# Patient Record
Sex: MALE | Race: WHITE
[De-identification: names, ages, dates, MRNs, and addresses within clinical notes are randomized per-mention and may not be internally consistent; named-entity substitution may affect disease eponyms.]

---
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
## 2022-10-06 NOTE — UC
Throat Pain/Nasal Ethan HPI





- HPI Summary


HPI Summary: 


Per RN triage:


"Onset this morning headache, nausea, sore throat, muscle pain, weakness."


-sx started this AM


-no rash. no fevers


+ST. no cough. 


+swollen glands


+ significant msucle aches. chills. 


+ nausea. no v/d. no abd pain


-GF Tamar is being seen as well. he reports that she has  ST too. 


-took LETTY ~ 2 pm (2 hrs before vitals)





- History of Current Complaint


Chief Complaint: UCGeneralIllness


Stated Complaint: ST,FEVER,CHILLS


Time Seen by Provider: 10/17/19 16:18


Pain Intensity: 5





- Allergies/Home Medications


Home Medications: 


 Home Medications





Pain Med 2 tab PO SEE INSTRUCTIONS PRN 10/17/19 [History]











PMH/Surg Hx/FS Hx/Imm Hx


Previously Healthy: Yes





- Surgical History


Surgical History: None





- Family History


Known Family History: Positive: Diabetes





- Social History


Alcohol Use: None


Substance Use Type: Marijuana


Smoking Status (MU): Smoker, Current Status Unknown


Type: Smokeless Tobacco





- Immunization History


Vaccination Up to Date: Yes





Review of Systems


All Other Systems Reviewed And Are Negative: Yes


Constitutional: Positive: Fever, Chills, Fatigue


Skin: Negative: Negative, Rash


Eyes: Positive: Negative


ENT: Positive: Sore Throat.  Negative: Ear Ache, Sinus Congestion, Sinus Pain/

Tenderness


Respiratory: Positive: Negative.  Negative: Shortness Of Breath, Cough


Cardiovascular: Positive: Negative.  Negative: Palpitations, Chest Pain


Gastrointestinal: Positive: Nausea.  Negative: Abdominal Pain, Vomiting, 

Diarrhea


Genitourinary: Positive: Negative


Motor: Positive: Negative


Neurovascular: Positive: Negative


Musculoskeletal: Positive: Negative


Neurological: Positive: Negative


Psychological: Positive: Negative


Is Patient Immunocompromised?: No





Physical Exam


Triage Information Reviewed: Yes


Appearance: Ill-Appearing - mild. wearing hooded sweatshirt in room bc he is 

cold


Vital Signs: 


 Initial Vital Signs











Temp  100.6 F   10/17/19 15:44


 


Pulse  101   10/17/19 15:44


 


Resp  24   10/17/19 15:44


 


BP  101/52   10/17/19 15:44


 


Pulse Ox  97   10/17/19 15:44











Vital Signs Reviewed: Yes


Eye Exam: Normal


ENT: Positive: Pharyngeal erythema, TMs normal, Uvula midline.  Negative: 

Tonsillar swelling, Tonsillar exudate, Sinus tenderness


Neck exam: Normal


Neck: Positive: Supple, Nontender, No Lymphadenopathy


Respiratory Exam: Normal


Respiratory: Positive: Lungs clear, Normal breath sounds, No respiratory 

distress, No accessory muscle use.  Negative: Crackles, Rhonchi, Stridor, 

Wheezing


Cardiovascular Exam: Normal


Cardiovascular: Positive: RRR, No Murmur, Pulses Normal


Abdominal Exam: Normal


Abdomen Description: Positive: Nontender, Soft


Bowel Sounds: Positive: Present


Musculoskeletal Exam: Normal


Neurological Exam: Normal


Psychological Exam: Normal


Skin Exam: Normal


Skin: Negative: Rashes





Throat Pain/Nasal Course/Dx





- Course


Course Of Treatment: 





+ rapid strep


-neg flu





-pt reports that he does not have a PCN allergy and that he is sure of it. His 

dad has an allergy to it he reports. has never had a problem w/ PCN. in past. 





- Differential Dx/Diagnosis


Differential Diagnosis/HQI/PQRI: Pharyngitis, Tonsillitis, URI


Provider Diagnosis: 


 Strep pharyngitis








Discharge ED





- Sign-Out/Discharge


Documenting (check all that apply): Patient Departure


All imaging exams completed and their final reports reviewed: No Studies





- Discharge Plan


Condition: Stable


Disposition: HOME


Prescriptions: 


Amoxicillin PO (*) [Amoxicillin 875 MG (*)] 875 mg PO BID #20 tab


Patient Education Materials:  Strep Throat in Children (DC)


Referrals: 


[facility] [Primary Care Provider] - 1 Week


Additional Instructions: 


-It is recommended that you take a probiotic daily while you are on 

antibiotics. A few common brands that you can buy over the counter are colon 

health, align and florastor. These can help prevent a colon infection called c 

diff that can be associated with antibiotic use. 


-Make sure you complete the entire course of antibiotics. If you develop any 

rashes, hives or itching, swelling in lips, tongue or throat you should go to 

the ER immediately. 


-Ibuprofen, tylenol can be helpful for pain, fever.





- Billing Disposition and Condition


Condition: STABLE


Disposition: Home 100